# Patient Record
Sex: MALE | Race: WHITE | NOT HISPANIC OR LATINO | Employment: PART TIME | URBAN - NONMETROPOLITAN AREA
[De-identification: names, ages, dates, MRNs, and addresses within clinical notes are randomized per-mention and may not be internally consistent; named-entity substitution may affect disease eponyms.]

---

## 2022-01-24 ENCOUNTER — OFFICE VISIT (OUTPATIENT)
Dept: FAMILY MEDICINE CLINIC | Age: 22
End: 2022-01-24

## 2022-01-24 VITALS
HEART RATE: 71 BPM | DIASTOLIC BLOOD PRESSURE: 73 MMHG | HEIGHT: 74 IN | BODY MASS INDEX: 27.21 KG/M2 | TEMPERATURE: 98.2 F | SYSTOLIC BLOOD PRESSURE: 130 MMHG | OXYGEN SATURATION: 96 % | WEIGHT: 212 LBS

## 2022-01-24 DIAGNOSIS — F41.9 ANXIETY: Primary | ICD-10-CM

## 2022-01-24 PROCEDURE — 99203 OFFICE O/P NEW LOW 30 MIN: CPT | Performed by: NURSE PRACTITIONER

## 2022-01-24 NOTE — PROGRESS NOTES
"Chief Complaint  emotional support animal paperwork (pt states he's needing paperwork for school for a emotinal support animal)    Subjective    Patient is a 21-year-old male in today with complaints of increased anxiety.  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  Patient reports often feeling overwhelmed trying to get everything accomplished, and sometimes is really irritable.  Patient avoids large crowds, but is having difficulty at college.  Patient has a service dog, that provides moderate relief for his anxiety.          Armani Lee presents to [facility]  Anxiety  Presents for initial visit. Onset was more than 5 years ago. The problem has been waxing and waning. Symptoms include depressed mood, excessive worry, irritability and nervous/anxious behavior. Patient reports no suicidal ideas. Symptoms occur most days. The severity of symptoms is severe. The symptoms are aggravated by social activities and specific phobias. The quality of sleep is poor. Nighttime awakenings: occasional.     There are no known risk factors. His past medical history is significant for anxiety/panic attacks. Past treatments include lifestyle changes. The treatment provided moderate relief. Compliance with prior treatments has been good.       Objective   Vital Signs:   /73 (BP Location: Right arm, Patient Position: Sitting, Cuff Size: Large Adult)   Pulse 71   Temp 98.2 °F (36.8 °C) (Oral)   Ht 188 cm (74\")   Wt 96.2 kg (212 lb)   SpO2 96%   BMI 27.22 kg/m²     Physical Exam  HENT:      Head: Normocephalic.   Cardiovascular:      Rate and Rhythm: Normal rate and regular rhythm.   Pulmonary:      Effort: Pulmonary effort is normal. No respiratory distress.      Breath sounds: Normal breath sounds. No stridor. No wheezing, rhonchi or rales.   Chest:      Chest wall: No " tenderness.   Abdominal:      General: Bowel sounds are normal.      Palpations: Abdomen is soft.   Skin:     General: Skin is warm and dry.   Neurological:      Mental Status: He is alert and oriented to person, place, and time.   Psychiatric:         Attention and Perception: Attention normal.         Mood and Affect: Mood is anxious.         Speech: Speech is rapid and pressured.         Behavior: Behavior is cooperative.         Thought Content: Thought content normal. Thought content does not include suicidal ideation. Thought content does not include suicidal plan.         Cognition and Memory: Cognition normal.         Judgment: Judgment normal.        Result Review :                 Assessment and Plan    There are no diagnoses linked to this encounter.    Follow Up   No follow-ups on file.  Patient was given instructions and counseling regarding his condition or for health maintenance advice. Please see specific information pulled into the AVS if appropriate.